# Patient Record
Sex: FEMALE | Race: WHITE
[De-identification: names, ages, dates, MRNs, and addresses within clinical notes are randomized per-mention and may not be internally consistent; named-entity substitution may affect disease eponyms.]

---

## 2017-03-31 ENCOUNTER — HOSPITAL ENCOUNTER (OUTPATIENT)
Dept: HOSPITAL 8 - LDOP | Age: 27
Discharge: HOME | End: 2017-03-31
Attending: OBSTETRICS & GYNECOLOGY
Payer: MEDICAID

## 2017-03-31 DIAGNOSIS — O10.912: ICD-10-CM

## 2017-03-31 DIAGNOSIS — Z3A.20: ICD-10-CM

## 2017-03-31 DIAGNOSIS — O26.892: Primary | ICD-10-CM

## 2017-03-31 DIAGNOSIS — R10.30: ICD-10-CM

## 2017-03-31 LAB — DAU SCREEN: (no result)

## 2017-03-31 PROCEDURE — 87086 URINE CULTURE/COLONY COUNT: CPT

## 2017-03-31 PROCEDURE — 59025 FETAL NON-STRESS TEST: CPT

## 2017-03-31 PROCEDURE — 81003 URINALYSIS AUTO W/O SCOPE: CPT

## 2017-03-31 PROCEDURE — G0463 HOSPITAL OUTPT CLINIC VISIT: HCPCS

## 2017-03-31 PROCEDURE — 99211 OFF/OP EST MAY X REQ PHY/QHP: CPT

## 2017-03-31 PROCEDURE — 80307 DRUG TEST PRSMV CHEM ANLYZR: CPT

## 2017-07-24 ENCOUNTER — HOSPITAL ENCOUNTER (OUTPATIENT)
Dept: HOSPITAL 8 - LDOP | Age: 27
Discharge: HOME | End: 2017-07-24
Attending: OBSTETRICS & GYNECOLOGY
Payer: MEDICAID

## 2017-07-24 VITALS — DIASTOLIC BLOOD PRESSURE: 69 MMHG | SYSTOLIC BLOOD PRESSURE: 131 MMHG

## 2017-07-24 VITALS — HEIGHT: 64 IN | WEIGHT: 225.31 LBS | BODY MASS INDEX: 38.47 KG/M2

## 2017-07-24 DIAGNOSIS — O10.913: ICD-10-CM

## 2017-07-24 DIAGNOSIS — O26.893: Primary | ICD-10-CM

## 2017-07-24 DIAGNOSIS — Z3A.37: ICD-10-CM

## 2017-07-24 DIAGNOSIS — R10.9: ICD-10-CM

## 2017-07-24 PROCEDURE — 87086 URINE CULTURE/COLONY COUNT: CPT

## 2017-07-24 PROCEDURE — G0463 HOSPITAL OUTPT CLINIC VISIT: HCPCS

## 2017-07-24 PROCEDURE — 99211 OFF/OP EST MAY X REQ PHY/QHP: CPT

## 2017-07-24 PROCEDURE — 59025 FETAL NON-STRESS TEST: CPT

## 2017-07-24 PROCEDURE — 81001 URINALYSIS AUTO W/SCOPE: CPT

## 2017-08-14 ENCOUNTER — HOSPITAL ENCOUNTER (OUTPATIENT)
Dept: HOSPITAL 8 - LDOP | Age: 27
Discharge: HOME | End: 2017-08-14
Attending: OBSTETRICS & GYNECOLOGY
Payer: MEDICAID

## 2017-08-14 VITALS — HEIGHT: 64 IN | WEIGHT: 230.38 LBS | BODY MASS INDEX: 39.33 KG/M2

## 2017-08-14 VITALS — DIASTOLIC BLOOD PRESSURE: 70 MMHG | SYSTOLIC BLOOD PRESSURE: 131 MMHG

## 2017-08-14 DIAGNOSIS — O10.913: ICD-10-CM

## 2017-08-14 DIAGNOSIS — Z3A.40: ICD-10-CM

## 2017-08-14 DIAGNOSIS — O48.0: ICD-10-CM

## 2017-08-14 DIAGNOSIS — O42.92: Primary | ICD-10-CM

## 2017-08-14 PROCEDURE — G0463 HOSPITAL OUTPT CLINIC VISIT: HCPCS

## 2017-08-14 PROCEDURE — 89060 EXAM SYNOVIAL FLUID CRYSTALS: CPT

## 2017-08-14 PROCEDURE — 59025 FETAL NON-STRESS TEST: CPT

## 2017-08-14 PROCEDURE — 99211 OFF/OP EST MAY X REQ PHY/QHP: CPT

## 2017-08-15 ENCOUNTER — HOSPITAL ENCOUNTER (INPATIENT)
Dept: HOSPITAL 8 - LDOP | Age: 27
LOS: 2 days | Discharge: HOME | End: 2017-08-17
Attending: OBSTETRICS & GYNECOLOGY | Admitting: OBSTETRICS & GYNECOLOGY
Payer: MEDICAID

## 2017-08-15 VITALS — DIASTOLIC BLOOD PRESSURE: 75 MMHG | SYSTOLIC BLOOD PRESSURE: 136 MMHG

## 2017-08-15 VITALS — DIASTOLIC BLOOD PRESSURE: 67 MMHG | SYSTOLIC BLOOD PRESSURE: 131 MMHG

## 2017-08-15 VITALS — SYSTOLIC BLOOD PRESSURE: 186 MMHG | DIASTOLIC BLOOD PRESSURE: 81 MMHG

## 2017-08-15 VITALS — HEIGHT: 64 IN | WEIGHT: 230.47 LBS | BODY MASS INDEX: 39.35 KG/M2

## 2017-08-15 VITALS — DIASTOLIC BLOOD PRESSURE: 76 MMHG | SYSTOLIC BLOOD PRESSURE: 131 MMHG

## 2017-08-15 DIAGNOSIS — Z3A.40: ICD-10-CM
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
## 2017-08-15 DIAGNOSIS — S30.814A: ICD-10-CM

## 2017-08-15 DIAGNOSIS — Z88.2: ICD-10-CM

## 2017-08-15 DIAGNOSIS — M32.9: ICD-10-CM

## 2017-08-15 LAB
AST SERPL-CCNC: 23 U/L (ref 15–37)
BUN SERPL-MCNC: 12 MG/DL (ref 7–18)
HCT VFR BLD CALC: 34.2 % (ref 34.6–47.8)
HCT VFR BLD CALC: 34.5 % (ref 34.6–47.8)
HGB BLD-MCNC: 11.4 G/DL (ref 11.7–16.4)
HGB BLD-MCNC: 11.4 G/DL (ref 11.7–16.4)
WBC # BLD AUTO: 11.7 X10^3/UL (ref 3.4–10)
WBC # BLD AUTO: 14.8 X10^3/UL (ref 3.4–10)

## 2017-08-15 PROCEDURE — 82570 ASSAY OF URINE CREATININE: CPT

## 2017-08-15 PROCEDURE — 85025 COMPLETE CBC W/AUTO DIFF WBC: CPT

## 2017-08-15 PROCEDURE — 82248 BILIRUBIN DIRECT: CPT

## 2017-08-15 PROCEDURE — 80053 COMPREHEN METABOLIC PANEL: CPT

## 2017-08-15 PROCEDURE — 3E0S3CZ: ICD-10-PCS | Performed by: NURSE PRACTITIONER

## 2017-08-15 PROCEDURE — 00HU33Z INSERTION OF INFUSION DEVICE INTO SPINAL CANAL, PERCUTANEOUS APPROACH: ICD-10-PCS | Performed by: NURSE PRACTITIONER

## 2017-08-15 PROCEDURE — 84550 ASSAY OF BLOOD/URIC ACID: CPT

## 2017-08-15 PROCEDURE — 84156 ASSAY OF PROTEIN URINE: CPT

## 2017-08-15 PROCEDURE — 86900 BLOOD TYPING SEROLOGIC ABO: CPT

## 2017-08-15 PROCEDURE — 81001 URINALYSIS AUTO W/SCOPE: CPT

## 2017-08-15 PROCEDURE — 36415 COLL VENOUS BLD VENIPUNCTURE: CPT

## 2017-08-15 PROCEDURE — 86850 RBC ANTIBODY SCREEN: CPT

## 2017-08-15 RX ADMIN — Medication SCH MLS/HR: at 09:23

## 2017-08-15 RX ADMIN — DOCUSATE SODIUM PRN MG: 100 CAPSULE, LIQUID FILLED ORAL at 18:52

## 2017-08-15 RX ADMIN — IBUPROFEN PRN MG: 600 TABLET ORAL at 12:31

## 2017-08-15 RX ADMIN — IBUPROFEN PRN MG: 600 TABLET ORAL at 18:52

## 2017-08-15 RX ADMIN — Medication SCH MLS/HR: at 18:57

## 2017-08-16 VITALS — DIASTOLIC BLOOD PRESSURE: 73 MMHG | SYSTOLIC BLOOD PRESSURE: 134 MMHG

## 2017-08-16 VITALS — SYSTOLIC BLOOD PRESSURE: 139 MMHG | DIASTOLIC BLOOD PRESSURE: 87 MMHG

## 2017-08-16 VITALS — SYSTOLIC BLOOD PRESSURE: 139 MMHG | DIASTOLIC BLOOD PRESSURE: 85 MMHG

## 2017-08-16 RX ADMIN — DOCUSATE SODIUM PRN MG: 100 CAPSULE, LIQUID FILLED ORAL at 20:09

## 2017-08-16 RX ADMIN — DOCUSATE SODIUM PRN MG: 100 CAPSULE, LIQUID FILLED ORAL at 14:13

## 2017-08-16 RX ADMIN — HYDROCODONE BITARTRATE AND ACETAMINOPHEN PRN TAB: 5; 325 TABLET ORAL at 20:09

## 2017-08-16 RX ADMIN — Medication SCH MLS/HR: at 15:23

## 2017-08-16 RX ADMIN — Medication SCH MLS/HR: at 05:23

## 2017-08-16 RX ADMIN — PRENATAL VIT W/ FE FUMARATE-FA TAB 27-0.8 MG SCH EACH: 27-0.8 TAB at 09:00

## 2017-08-16 RX ADMIN — IBUPROFEN PRN MG: 600 TABLET ORAL at 01:03

## 2017-08-16 RX ADMIN — IBUPROFEN PRN MG: 600 TABLET ORAL at 07:06

## 2017-08-16 RX ADMIN — IBUPROFEN PRN MG: 600 TABLET ORAL at 20:09

## 2017-08-16 RX ADMIN — IBUPROFEN PRN MG: 600 TABLET ORAL at 14:13

## 2017-08-17 VITALS — SYSTOLIC BLOOD PRESSURE: 147 MMHG | DIASTOLIC BLOOD PRESSURE: 89 MMHG

## 2017-08-17 RX ADMIN — HYDROCODONE BITARTRATE AND ACETAMINOPHEN PRN TAB: 5; 325 TABLET ORAL at 11:55

## 2017-08-17 RX ADMIN — HYDROCODONE BITARTRATE AND ACETAMINOPHEN PRN TAB: 5; 325 TABLET ORAL at 05:52

## 2017-08-17 RX ADMIN — Medication SCH MLS/HR: at 01:23

## 2017-08-17 RX ADMIN — HYDROCODONE BITARTRATE AND ACETAMINOPHEN PRN TAB: 5; 325 TABLET ORAL at 00:57

## 2017-08-17 RX ADMIN — IBUPROFEN PRN MG: 600 TABLET ORAL at 05:51

## 2017-08-17 RX ADMIN — DOCUSATE SODIUM PRN MG: 100 CAPSULE, LIQUID FILLED ORAL at 11:54

## 2017-08-17 RX ADMIN — PRENATAL VIT W/ FE FUMARATE-FA TAB 27-0.8 MG SCH EACH: 27-0.8 TAB at 11:54

## 2017-10-04 ENCOUNTER — HOSPITAL ENCOUNTER (OUTPATIENT)
Dept: HOSPITAL 8 - OUT | Age: 27
End: 2017-10-04
Attending: OBSTETRICS & GYNECOLOGY
Payer: MEDICAID

## 2017-10-04 VITALS — WEIGHT: 222.23 LBS | BODY MASS INDEX: 37.94 KG/M2 | HEIGHT: 64 IN

## 2017-10-04 VITALS — SYSTOLIC BLOOD PRESSURE: 144 MMHG | DIASTOLIC BLOOD PRESSURE: 87 MMHG

## 2017-10-04 DIAGNOSIS — Z30.2: Primary | ICD-10-CM

## 2017-10-04 DIAGNOSIS — Z88.0: ICD-10-CM

## 2017-10-04 DIAGNOSIS — Z88.8: ICD-10-CM

## 2017-10-04 DIAGNOSIS — Z79.82: ICD-10-CM

## 2017-10-04 LAB
HCG UR LOT: (no result)
HCG UR OBC: (no result)

## 2017-10-04 PROCEDURE — 88302 TISSUE EXAM BY PATHOLOGIST: CPT

## 2017-10-04 PROCEDURE — 81025 URINE PREGNANCY TEST: CPT

## 2017-10-04 PROCEDURE — 58661 LAPAROSCOPY REMOVE ADNEXA: CPT

## 2017-10-04 RX ADMIN — FENTANYL CITRATE PRN MCG: 50 INJECTION INTRAMUSCULAR; INTRAVENOUS at 10:32

## 2017-10-04 RX ADMIN — ACETAMINOPHEN PRN MG: 325 TABLET, FILM COATED ORAL at 10:27

## 2017-10-04 RX ADMIN — ACETAMINOPHEN PRN MG: 325 TABLET, FILM COATED ORAL at 10:28

## 2017-10-04 RX ADMIN — FENTANYL CITRATE PRN MCG: 50 INJECTION INTRAMUSCULAR; INTRAVENOUS at 10:25

## 2018-10-01 ENCOUNTER — HOSPITAL ENCOUNTER (EMERGENCY)
Dept: HOSPITAL 8 - ED | Age: 28
Discharge: HOME | End: 2018-10-01
Payer: SELF-PAY

## 2018-10-01 VITALS — SYSTOLIC BLOOD PRESSURE: 124 MMHG | DIASTOLIC BLOOD PRESSURE: 60 MMHG

## 2018-10-01 VITALS — HEIGHT: 63 IN | WEIGHT: 211.2 LBS | BODY MASS INDEX: 37.42 KG/M2

## 2018-10-01 DIAGNOSIS — M85.30: ICD-10-CM
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
## 2018-10-01 DIAGNOSIS — M25.551: ICD-10-CM
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
## 2018-10-01 DIAGNOSIS — S39.012A: Primary | ICD-10-CM

## 2018-10-01 DIAGNOSIS — F17.200: ICD-10-CM

## 2018-10-01 PROCEDURE — 99284 EMERGENCY DEPT VISIT MOD MDM: CPT

## 2018-10-01 PROCEDURE — 72110 X-RAY EXAM L-2 SPINE 4/>VWS: CPT

## 2019-02-23 ENCOUNTER — HOSPITAL ENCOUNTER (EMERGENCY)
Dept: HOSPITAL 8 - ED | Age: 29
Discharge: HOME | End: 2019-02-23
Payer: SELF-PAY

## 2019-02-23 VITALS — DIASTOLIC BLOOD PRESSURE: 75 MMHG | SYSTOLIC BLOOD PRESSURE: 125 MMHG

## 2019-02-23 VITALS — HEIGHT: 63 IN | WEIGHT: 203.27 LBS | BODY MASS INDEX: 36.02 KG/M2

## 2019-02-23 DIAGNOSIS — R21: ICD-10-CM

## 2019-02-23 DIAGNOSIS — J06.9: Primary | ICD-10-CM

## 2019-02-23 LAB
ALBUMIN SERPL-MCNC: 3.4 G/DL (ref 3.4–5)
ANION GAP SERPL CALC-SCNC: 6 MMOL/L (ref 5–15)
BASOPHILS # BLD AUTO: 0.02 X10^3/UL (ref 0–0.1)
BASOPHILS NFR BLD AUTO: 0 % (ref 0–1)
CALCIUM SERPL-MCNC: 8.3 MG/DL (ref 8.5–10.1)
CHLORIDE SERPL-SCNC: 108 MMOL/L (ref 98–107)
CREAT SERPL-MCNC: 0.86 MG/DL (ref 0.55–1.02)
EOSINOPHIL # BLD AUTO: 0.05 X10^3/UL (ref 0–0.4)
EOSINOPHIL NFR BLD AUTO: 1 % (ref 1–7)
ERYTHROCYTE [DISTWIDTH] IN BLOOD BY AUTOMATED COUNT: 13.9 % (ref 9.6–15.2)
LYMPHOCYTES # BLD AUTO: 1.33 X10^3/UL (ref 1–3.4)
LYMPHOCYTES NFR BLD AUTO: 24 % (ref 22–44)
MCH RBC QN AUTO: 33 PG (ref 27–34.8)
MCHC RBC AUTO-ENTMCNC: 33.4 G/DL (ref 32.4–35.8)
MCV RBC AUTO: 98.7 FL (ref 80–100)
MD: NO
MONOCYTES # BLD AUTO: 0.65 X10^3/UL (ref 0.2–0.8)
MONOCYTES NFR BLD AUTO: 12 % (ref 2–9)
NEUTROPHILS # BLD AUTO: 3.49 X10^3/UL (ref 1.8–6.8)
NEUTROPHILS NFR BLD AUTO: 63 % (ref 42–75)
PLATELET # BLD AUTO: 202 X10^3/UL (ref 130–400)
PMV BLD AUTO: 8.8 FL (ref 7.4–10.4)
RBC # BLD AUTO: 4.23 X10^6/UL (ref 3.82–5.3)
TROPONIN I SERPL-MCNC: < 0.015 NG/ML (ref 0–0.04)

## 2019-02-23 PROCEDURE — 80048 BASIC METABOLIC PNL TOTAL CA: CPT

## 2019-02-23 PROCEDURE — 87400 INFLUENZA A/B EACH AG IA: CPT

## 2019-02-23 PROCEDURE — 84484 ASSAY OF TROPONIN QUANT: CPT

## 2019-02-23 PROCEDURE — 93005 ELECTROCARDIOGRAM TRACING: CPT

## 2019-02-23 PROCEDURE — 82040 ASSAY OF SERUM ALBUMIN: CPT

## 2019-02-23 PROCEDURE — 85025 COMPLETE CBC W/AUTO DIFF WBC: CPT

## 2019-02-23 PROCEDURE — 99284 EMERGENCY DEPT VISIT MOD MDM: CPT

## 2019-02-23 PROCEDURE — 87081 CULTURE SCREEN ONLY: CPT

## 2019-02-23 PROCEDURE — 87880 STREP A ASSAY W/OPTIC: CPT

## 2019-02-23 PROCEDURE — 71046 X-RAY EXAM CHEST 2 VIEWS: CPT

## 2019-02-23 PROCEDURE — 36415 COLL VENOUS BLD VENIPUNCTURE: CPT

## 2019-02-23 NOTE — NUR
27 Y/O FEMALE PRESENTS TO ED WITH C/O "PNA"  PER PT, " I THINK I HAVE PNA, BUT 
I'VE NEVER HAD IT BEFORE. I STARTED FEELING BAD ON WEDNESDAY. I  HAD SOME CP ON 
WEDNESDAY. THEN THE NEXT DAY I STARTED COUGHING AND IT ALL GOT WORSE." NO C/O 
N/V/D, TRAUMA, SYNCOPE. PT PLACED ON CONT PULSE OX,NIBP, CARDIAC MONITOR.

## 2020-01-17 ENCOUNTER — HOSPITAL ENCOUNTER (EMERGENCY)
Dept: HOSPITAL 8 - ED | Age: 30
Discharge: HOME | End: 2020-01-17
Payer: SELF-PAY

## 2020-01-17 VITALS — DIASTOLIC BLOOD PRESSURE: 70 MMHG | SYSTOLIC BLOOD PRESSURE: 135 MMHG

## 2020-01-17 VITALS — WEIGHT: 198.2 LBS | HEIGHT: 64 IN | BODY MASS INDEX: 33.84 KG/M2

## 2020-01-17 DIAGNOSIS — J02.0: Primary | ICD-10-CM

## 2020-01-17 DIAGNOSIS — Z98.51: ICD-10-CM

## 2020-01-17 DIAGNOSIS — F17.200: ICD-10-CM

## 2020-01-17 LAB
ALBUMIN SERPL-MCNC: 3.3 G/DL (ref 3.4–5)
ALP SERPL-CCNC: 70 U/L (ref 45–117)
ALT SERPL-CCNC: 15 U/L (ref 12–78)
ANION GAP SERPL CALC-SCNC: 8 MMOL/L (ref 5–15)
BASOPHILS # BLD AUTO: 0.06 X10^3/UL (ref 0–0.1)
BASOPHILS NFR BLD AUTO: 0 % (ref 0–1)
BILIRUB SERPL-MCNC: 0.6 MG/DL (ref 0.2–1)
CALCIUM SERPL-MCNC: 8.1 MG/DL (ref 8.5–10.1)
CHLORIDE SERPL-SCNC: 107 MMOL/L (ref 98–107)
CREAT SERPL-MCNC: 0.69 MG/DL (ref 0.55–1.02)
EOSINOPHIL # BLD AUTO: 0.04 X10^3/UL (ref 0–0.4)
EOSINOPHIL NFR BLD AUTO: 0 % (ref 1–7)
ERYTHROCYTE [DISTWIDTH] IN BLOOD BY AUTOMATED COUNT: 14 % (ref 9.6–15.2)
LYMPHOCYTES # BLD AUTO: 1.42 X10^3/UL (ref 1–3.4)
LYMPHOCYTES NFR BLD AUTO: 8 % (ref 22–44)
MCH RBC QN AUTO: 35 PG (ref 27–34.8)
MCHC RBC AUTO-ENTMCNC: 34 G/DL (ref 32.4–35.8)
MCV RBC AUTO: 102.9 FL (ref 80–100)
MD: (no result)
MONOCYTES # BLD AUTO: 1.4 X10^3/UL (ref 0.2–0.8)
MONOCYTES NFR BLD AUTO: 8 % (ref 2–9)
NEUTROPHILS # BLD AUTO: 15.63 X10^3/UL (ref 1.8–6.8)
NEUTROPHILS NFR BLD AUTO: 84 % (ref 42–75)
PLATELET # BLD AUTO: 288 X10^3/UL (ref 130–400)
PMV BLD AUTO: 9.1 FL (ref 7.4–10.4)
PROT SERPL-MCNC: 7.7 G/DL (ref 6.4–8.2)
RBC # BLD AUTO: 3.96 X10^6/UL (ref 3.82–5.3)

## 2020-01-17 PROCEDURE — 85025 COMPLETE CBC W/AUTO DIFF WBC: CPT

## 2020-01-17 PROCEDURE — 96365 THER/PROPH/DIAG IV INF INIT: CPT

## 2020-01-17 PROCEDURE — 86308 HETEROPHILE ANTIBODY SCREEN: CPT

## 2020-01-17 PROCEDURE — 96375 TX/PRO/DX INJ NEW DRUG ADDON: CPT

## 2020-01-17 PROCEDURE — 99283 EMERGENCY DEPT VISIT LOW MDM: CPT

## 2020-01-17 PROCEDURE — 36415 COLL VENOUS BLD VENIPUNCTURE: CPT

## 2020-01-17 PROCEDURE — 80053 COMPREHEN METABOLIC PANEL: CPT

## 2020-01-17 PROCEDURE — 96376 TX/PRO/DX INJ SAME DRUG ADON: CPT

## 2020-01-17 RX ADMIN — MORPHINE SULFATE PRN MG: 4 INJECTION INTRAVENOUS at 11:02

## 2020-01-17 RX ADMIN — MORPHINE SULFATE PRN MG: 4 INJECTION INTRAVENOUS at 10:10

## 2020-04-20 NOTE — NUR
PT STATES HER JOINTS HURT AND THAT SHE HAS ERYTHEMA/DRY SKIN OUTBREAK ALL OVER 
BODY. PT STATES SHE AS HAD IT FOR A MONTH AND BELIEVES IT IS A LUPUS FLAREUP

## 2020-04-20 NOTE — NUR
TASK RN:PT RESTING IN [facility]. PT'S AOX4. RESPS EVEN AND UNLABORED. BP/SPO2 
MONITORS IN PLACE. CALL LIGHT WITHIN REACH.

## 2020-04-30 NOTE — NUR
PLACED PT ON EKG MONITOR. PT COMPLAINING OF R SIDED THROAT PAIN, CHILLS, BODY 
ACHES, AND FEVER THAT BEGAN TODAY. PT DOES HAVE LUPUS AND HAS BEEN ON PREDNISON 
BUT STOPPED TAKING ON HER OWN 3 DAYS AGO BECAUSE SHE STATED "I THOUGHT IT WAS 
MAKING MY SYMPTOMS WORSE". PT STATES SHE TOOK "SMALL" AMOUNT OF TYLENOL TODAY 
WITH A "SIP" OF ROBITUSSIN. UPON EXAMINATION, R SIDE OF THROAT HAS REDNESS AND 
SWELLING. PT STABLE AT THIS TIME.

## 2021-06-24 NOTE — NUR
Patient given discharge instructions and they have confirmed that they 
understand the instructions.  Patient ambulatory with steady gait. NAD, all 
questions answered appropriately, denies additional needs at this time. No 
personal belongings left in room after discharge.

## 2021-06-24 NOTE — NUR
INITINAL CONTACT WITH PT. PT W/ C/O LEFT FOOT SWELLING AND CALF PAIN X4-5 DAYS, 
AND LEFT HIP PAIN THAT STARTED YESTERDAY. LEFT ANKLE SPRAIN X2 WEEKS AGO, 
FAMILY HX OF DVTs. CHRISTIAN WEBBER TO BEDSIDE FOR EVALUATION. PT TO ROOM WITH 
STEADY GAIT. POSTIONED TO COMFORT IN BED. ATTACHED TO MONITORS. MARIANA LAFLEUR.